# Patient Record
(demographics unavailable — no encounter records)

---

## 2024-10-17 NOTE — REVIEW OF SYSTEMS
[Negative] : Psychiatric [Fever] : no fever [Chills] : no chills [Eye Pain] : no eye pain [Red Eyes] : eyes not red [Earache] : no earache [Loss Of Hearing] : no hearing loss [Chest Pain] : no chest pain [Palpitations] : no palpitations [Shortness Of Breath] : no shortness of breath [Wheezing] : no wheezing [Abdominal Pain] : no abdominal pain [Vomiting] : no vomiting [Dysuria] : no dysuria [Pelvic Pain] : no pelvic pain [Joint Pain] : no joint pain [Skin Lesions] : no skin lesions [Confused] : no confusion [Convulsions] : no convulsions [Easy Bleeding] : no tendency for easy bleeding [Easy Bruising] : no tendency for easy bruising

## 2024-10-17 NOTE — ASSESSMENT
[FreeTextEntry1] : HIV - controlled - repeat labs  - continue triumeq  HLD - , HDL 68,  6/2024 - on lipitor 20 mg daily  - repeat lipid panel   HME Syphillis negative 3/14/2024 Quant gold negative 3/14/2024 Hep A IgG + 3/14/2024 Hep B Sab negative 3/14/2024 Hep C ab negative 43/14/2024  Vaccinatiions per PMD - recommended Hep B vaccine and influenza -- she will see her PMD for tihs   RTC in 4 months.  35 minutes was spend on medical discussion re: medication compliance. On antiretroviral treatment.

## 2024-10-17 NOTE — HISTORY OF PRESENT ILLNESS
[FreeTextEntry1] : 60 year old female here to establish HIV care.  Arrived August 2022 from [facility]. She is currently living with her daughter. Diagnosed with HIV April 2021. She started to lose eye vision in right eye associated with weight loss. During work-up for this, found to be HIV positive. No hx of IV drug use  Interval History: No acute complaints. Feels well overall. On triumeq, She follows with PMD and was recently seen last month.  She was started on daily vitamin D and magnesium.  She remains on atorvastatin -- no myalgias   PCP Dr. Diane Ramírez

## 2024-10-17 NOTE — PHYSICAL EXAM
[General Appearance - Alert] : alert [General Appearance - In No Acute Distress] : in no acute distress [Sclera] : the sclera and conjunctiva were normal [Outer Ear] : the ears and nose were normal in appearance [Both Tympanic Membranes Were Examined] : both tympanic membranes were normal [Neck Appearance] : the appearance of the neck was normal [Respiration, Rhythm And Depth] : normal respiratory rhythm and effort [Heart Rate And Rhythm] : heart rate was normal and rhythm regular [Heart Sounds] : normal S1 and S2 [Edema] : there was no peripheral edema [Bowel Sounds] : normal bowel sounds [Abdomen Soft] : soft [No Palpable Adenopathy] : no palpable adenopathy [] : no rash [No Focal Deficits] : no focal deficits [Oriented To Time, Place, And Person] : oriented to person, place, and time [Affect] : the affect was normal

## 2025-02-13 NOTE — REVIEW OF SYSTEMS
[Fever] : no fever [Chills] : no chills [Eye Pain] : no eye pain [Red Eyes] : eyes not red [Earache] : no earache [Loss Of Hearing] : no hearing loss [Chest Pain] : no chest pain [Palpitations] : no palpitations [Shortness Of Breath] : no shortness of breath [Wheezing] : no wheezing [Abdominal Pain] : no abdominal pain [Vomiting] : no vomiting [Dysuria] : no dysuria [Joint Pain] : no joint pain [Skin Lesions] : no skin lesions [Confused] : no confusion [Convulsions] : no convulsions [Easy Bleeding] : no tendency for easy bleeding [Easy Bruising] : no tendency for easy bruising [Negative] : Psychiatric

## 2025-02-13 NOTE — PHYSICAL EXAM
[General Appearance - Alert] : alert [General Appearance - In No Acute Distress] : in no acute distress [Sclera] : the sclera and conjunctiva were normal [Outer Ear] : the ears and nose were normal in appearance [Both Tympanic Membranes Were Examined] : both tympanic membranes were normal [Neck Appearance] : the appearance of the neck was normal [Respiration, Rhythm And Depth] : normal respiratory rhythm and effort [Heart Rate And Rhythm] : heart rate was normal and rhythm regular [Heart Sounds] : normal S1 and S2 [Edema] : there was no peripheral edema [Bowel Sounds] : normal bowel sounds [Abdomen Soft] : soft [No Palpable Adenopathy] : no palpable adenopathy [Musculoskeletal - Swelling] : no joint swelling [] : no rash [No Focal Deficits] : no focal deficits [Oriented To Time, Place, And Person] : oriented to person, place, and time [Affect] : the affect was normal

## 2025-02-13 NOTE — ASSESSMENT
[FreeTextEntry1] : HIV - controlled - repeat labs  - continue triumeq  HLD - , HDL 68,  6/2024 - increased to  lipitor 40 mg daily by her PMD - On Vit D, Magensium, and Fish Oil  HME Syphillis negative 3/14/2024 Quant gold negative 3/14/2024 Hep A IgG + 3/14/2024 Hep B Sab negative 3/14/2024 Hep C ab negative 43/14/2024  Vaccinatiions per PMD - Discussed Flu and Hep B vaccine but defers   RTC in 4 months. Received ObGyn referral from PMD -- discussed mammography as well  Seen by optometrist - [facility] - Dr. Putnam in [place]